# Patient Record
Sex: FEMALE | Race: WHITE | NOT HISPANIC OR LATINO | Employment: OTHER | URBAN - METROPOLITAN AREA
[De-identification: names, ages, dates, MRNs, and addresses within clinical notes are randomized per-mention and may not be internally consistent; named-entity substitution may affect disease eponyms.]

---

## 2023-10-31 ENCOUNTER — HOSPITAL ENCOUNTER (OUTPATIENT)
Dept: RADIOLOGY | Facility: HOSPITAL | Age: 68
Discharge: HOME | End: 2023-10-31
Payer: MEDICARE

## 2023-10-31 DIAGNOSIS — M25.562 ACUTE PAIN OF LEFT KNEE: ICD-10-CM

## 2023-10-31 PROCEDURE — 73721 MRI JNT OF LWR EXTRE W/O DYE: CPT | Mod: LT

## 2023-11-02 ENCOUNTER — TELEPHONE (OUTPATIENT)
Dept: ORTHOPEDIC SURGERY | Facility: CLINIC | Age: 68
End: 2023-11-02

## 2023-11-02 ENCOUNTER — OFFICE VISIT (OUTPATIENT)
Dept: ORTHOPEDIC SURGERY | Facility: CLINIC | Age: 68
End: 2023-11-02
Payer: MEDICARE

## 2023-11-02 ENCOUNTER — ANCILLARY PROCEDURE (OUTPATIENT)
Dept: RADIOLOGY | Facility: CLINIC | Age: 68
End: 2023-11-02
Payer: MEDICARE

## 2023-11-02 VITALS — BODY MASS INDEX: 34.15 KG/M2 | WEIGHT: 200 LBS | HEIGHT: 64 IN

## 2023-11-02 DIAGNOSIS — M25.552 LEFT HIP PAIN: ICD-10-CM

## 2023-11-02 DIAGNOSIS — S83.242S ACUTE MEDIAL MENISCAL TEAR, LEFT, SEQUELA: Primary | ICD-10-CM

## 2023-11-02 PROCEDURE — 20610 DRAIN/INJ JOINT/BURSA W/O US: CPT | Performed by: ORTHOPAEDIC SURGERY

## 2023-11-02 PROCEDURE — 1160F RVW MEDS BY RX/DR IN RCRD: CPT | Performed by: ORTHOPAEDIC SURGERY

## 2023-11-02 PROCEDURE — 73502 X-RAY EXAM HIP UNI 2-3 VIEWS: CPT | Mod: LT

## 2023-11-02 PROCEDURE — 73502 X-RAY EXAM HIP UNI 2-3 VIEWS: CPT | Mod: LEFT SIDE | Performed by: RADIOLOGY

## 2023-11-02 PROCEDURE — 99213 OFFICE O/P EST LOW 20 MIN: CPT | Performed by: ORTHOPAEDIC SURGERY

## 2023-11-02 PROCEDURE — 1125F AMNT PAIN NOTED PAIN PRSNT: CPT | Performed by: ORTHOPAEDIC SURGERY

## 2023-11-02 PROCEDURE — 1159F MED LIST DOCD IN RCRD: CPT | Performed by: ORTHOPAEDIC SURGERY

## 2023-11-02 RX ORDER — LIDOCAINE HYDROCHLORIDE 20 MG/ML
2 INJECTION, SOLUTION INFILTRATION; PERINEURAL
Status: COMPLETED | OUTPATIENT
Start: 2023-11-02 | End: 2023-11-02

## 2023-11-02 RX ORDER — KETOROLAC TROMETHAMINE 30 MG/ML
30 INJECTION, SOLUTION INTRAMUSCULAR; INTRAVENOUS
Status: COMPLETED | OUTPATIENT
Start: 2023-11-02 | End: 2023-11-02

## 2023-11-02 RX ORDER — TRIAMCINOLONE ACETONIDE 40 MG/ML
80 INJECTION, SUSPENSION INTRA-ARTICULAR; INTRAMUSCULAR
Status: COMPLETED | OUTPATIENT
Start: 2023-11-02 | End: 2023-11-02

## 2023-11-02 RX ADMIN — TRIAMCINOLONE ACETONIDE 80 MG: 40 INJECTION, SUSPENSION INTRA-ARTICULAR; INTRAMUSCULAR at 11:37

## 2023-11-02 RX ADMIN — KETOROLAC TROMETHAMINE 30 MG: 30 INJECTION, SOLUTION INTRAMUSCULAR; INTRAVENOUS at 11:37

## 2023-11-02 RX ADMIN — LIDOCAINE HYDROCHLORIDE 2 ML: 20 INJECTION, SOLUTION INFILTRATION; PERINEURAL at 11:37

## 2023-11-02 ASSESSMENT — PAIN SCALES - GENERAL: PAINLEVEL_OUTOF10: 7

## 2023-11-02 ASSESSMENT — PAIN - FUNCTIONAL ASSESSMENT: PAIN_FUNCTIONAL_ASSESSMENT: 0-10

## 2023-11-02 NOTE — PROGRESS NOTES
Subjective    Patient ID: Hanh Ng is a 68 y.o. female.    Chief Complaint: Follow-up of the Left Knee     Last Surgery: No surgery found  Last Surgery Date: No surgery found    HPI left knee pain she is in after her MRI    Objective   Ortho Exam range of motion 0 to 115 degrees excellent stability her pain today is mainly lateral to the patellar tendon she also has pain when I internally rotate her hip and this radiates to her knee    Image Results:  MR knee left wo IV contrast  Narrative: Interpreted By:  Jordi Hankins,   STUDY:  MR KNEE LEFT WO IV CONTRAST; 10/31/2023 9:10 am      INDICATION:  Signs/Symptoms:MECHANICAL LEFT KNEE PAIN; /pain laterally/fall 1  month ago      COMPARISON:  None      ACCESSION NUMBER(S):  [id number]      ORDERING CLINICIAN:  SAMANTA FONG      TECHNIQUE:  Multiple, multiplanar sequences of the left knee were obtained.      FINDINGS:  Horizontal tear through the posterior horn of the medial meniscus can  be seen. Equivocal body segment flap fragment seen within the  inferior gutter.      The lateral meniscus is intact.      The ACL and PCL are intact. The MCL and lateral ligamentous complex  are unremarkable.      Moderate thinning of the medial tibiofemoral articular cartilage can  be seen along the inner margin.      The lateral tibiofemoral articular cartilage reveals surface  fissuring noted.      The patellofemoral articular cartilage is moderately thinned.      The quadriceps and patellar tendons are intact.      There is no significant joint effusion. No Wasserman's cyst is identified.      No acute fracture or dislocation is identified. The bone marrow is  grossly normal.      Impression: 1. Horizontal tear posterior horn medial meniscus with tear  communicating with the inferior articular surface. Additional  equivocal body segment flap fragment within the inferior gutter.  2. Mild-to-moderate tricompartmental osteoarthrosis as above.      MACRO:  None      Signed by:  Jordi Hankins 10/31/2023 10:36 AM  Dictation workstation:   [id number]      Assessment/Plan her MRI shows some arthritis and a significant medial meniscal tear we discussed this we discussed arthroscopy we determined we will go ahead and try this 1 more cortisone shot she is good to continue doing her exercises and we will get x-rays of her hip on the way out to see if this could be contributing to her pain it is odd that her pain is not more medial today    L Inj/Asp: L knee on 11/2/2023 11:37 AM  Indications: pain  Details: 22 G needle, anterolateral approach  Medications: 80 mg triamcinolone acetonide 40 mg/mL; 2 mL lidocaine 20 mg/mL (2 %); 30 mg ketorolac 30 mg/mL (1 mL) (1 cc of Toradol 30mg/1mL was added)  Outcome: tolerated well, no immediate complications  Procedure, treatment alternatives, risks and benefits explained, specific risks discussed. Immediately prior to procedure a time out was called to verify the correct patient, procedure, equipment, support staff and site/side marked as required. Patient was prepped and draped in the usual sterile fashion.           Encounter Diagnoses:  No diagnosis found.    No orders of the defined types were placed in this encounter.    No follow-ups on file.

## 2023-11-10 ENCOUNTER — TELEPHONE (OUTPATIENT)
Dept: ORTHOPEDIC SURGERY | Facility: CLINIC | Age: 68
End: 2023-11-10
Payer: MEDICARE

## 2023-11-28 ENCOUNTER — APPOINTMENT (OUTPATIENT)
Dept: ORTHOPEDIC SURGERY | Facility: CLINIC | Age: 68
End: 2023-11-28
Payer: MEDICARE

## 2023-12-01 ENCOUNTER — APPOINTMENT (OUTPATIENT)
Dept: ORTHOPEDIC SURGERY | Facility: CLINIC | Age: 68
End: 2023-12-01
Payer: MEDICARE

## 2023-12-08 ENCOUNTER — OFFICE VISIT (OUTPATIENT)
Dept: ORTHOPEDIC SURGERY | Facility: CLINIC | Age: 68
End: 2023-12-08
Payer: MEDICARE

## 2023-12-08 DIAGNOSIS — S83.242S ACUTE MEDIAL MENISCAL TEAR, LEFT, SEQUELA: Primary | ICD-10-CM

## 2023-12-08 PROCEDURE — 99213 OFFICE O/P EST LOW 20 MIN: CPT | Performed by: ORTHOPAEDIC SURGERY

## 2023-12-08 PROCEDURE — 1125F AMNT PAIN NOTED PAIN PRSNT: CPT | Performed by: ORTHOPAEDIC SURGERY

## 2023-12-08 PROCEDURE — 1159F MED LIST DOCD IN RCRD: CPT | Performed by: ORTHOPAEDIC SURGERY

## 2023-12-08 PROCEDURE — 1160F RVW MEDS BY RX/DR IN RCRD: CPT | Performed by: ORTHOPAEDIC SURGERY

## 2023-12-08 NOTE — PROGRESS NOTES
Subjective    Patient ID: Hanh Ng is a 68 y.o. female.    Chief Complaint: Follow-up of the Left Hip     Last Surgery: No surgery found  Last Surgery Date: No surgery found    HPI she is in for her knee again she has a meniscal tear some arthritic change and some arthritic change in the left hip but today it seems to be the left knee is bothering her most    Objective   Ortho Exam today her range of motion is 0 to 120 degrees general laxity medial joint line tenderness with positive Opal patellofemoral effusion    Image Results:  XR hip left 2 or 3 views  Narrative: Interpreted By:  Conchis Lovell,   STUDY:  Single view pelvis.  Left hip, two views.      INDICATION:  Signs/Symptoms:LEFT HIP PAIN.      COMPARISON:  None.      ACCESSION NUMBER(S):  [id number]      ORDERING CLINICIAN:  SAMANTA FONG      FINDINGS:  No acute fracture or malalignment.  Moderate to severe left hip osteoarthrosis with joint space loss and  subchondral sclerosis. Mild right hip osteoarthrosis.  Lower lumbar spine degenerative changes with levoscoliosis.  Soft tissues are within normal limits.      Impression: 1. Moderate to severe left hip osteoarthrosis.  2. Additional findings as above.      MACRO:  None.      Signed by: Conchis Lovell 11/3/2023 6:51 PM  Dictation workstation:   [id number]      Assessment/Plan again her MRI shows a significant medial meniscal tear some arthritis in her knee x-rays look intact hip x-ray shows arthritic changes at this point it appears that it is her knee is hurting injection did not help recommend scoping this saying that we will give her relief explained to her that it is unpredictable but she is certainly not a candidate for knee replacement at this point  Encounter Diagnoses:  Acute medial meniscal tear, left, sequela    No orders of the defined types were placed in this encounter.    No follow-ups on file.

## 2023-12-22 DIAGNOSIS — S83.242S ACUTE MEDIAL MENISCAL TEAR, LEFT, SEQUELA: ICD-10-CM

## 2024-01-15 ENCOUNTER — EVALUATION (OUTPATIENT)
Dept: PHYSICAL THERAPY | Facility: CLINIC | Age: 69
End: 2024-01-15
Payer: MEDICARE

## 2024-01-15 DIAGNOSIS — S83.242S ACUTE MEDIAL MENISCAL TEAR, LEFT, SEQUELA: ICD-10-CM

## 2024-01-15 PROCEDURE — 97161 PT EVAL LOW COMPLEX 20 MIN: CPT | Mod: GP

## 2024-01-15 PROCEDURE — 97110 THERAPEUTIC EXERCISES: CPT | Mod: GP

## 2024-01-15 ASSESSMENT — PAIN - FUNCTIONAL ASSESSMENT: PAIN_FUNCTIONAL_ASSESSMENT: 0-10

## 2024-01-15 ASSESSMENT — PAIN SCALES - GENERAL: PAINLEVEL_OUTOF10: 9

## 2024-01-15 NOTE — PROGRESS NOTES
Physical Therapy    Physical Therapy Treatment    Patient Name: Hanh Ng  MRN: [id number]  Today's Date: 1/15/2024         Assessment:  PT Assessment  Assessment Comment: pt with know Hx of L meniscus tear presents with impaired strength, ROM, and endurance mainly in the L hip.  These deficits impair her mechanics and add increased stress to the L knee during all activities.  She would benefit from skilled therapy to allow for improved gait mechanics, easier stair negotiation, and return to her home gym routine.  Plan:  OP PT Plan  Treatment/Interventions: Blood flow restriction therapy, Dry needling, Electrical stimulation, Education/ Instruction, Gait training, Manual therapy, Neuromuscular re-education, Taping techniques, Therapeutic activities, Therapeutic exercises  PT Plan: Skilled PT    Current Problem  1. Acute medial meniscal tear, left, sequela  Referral to Physical Therapy    Follow Up In Physical Therapy          General     General  Reason for Referral: L knee pain  Referred By: walker  Past Medical History Relevant to Rehab: L meniscus tear  General Comment: pt states her knee pain is getting worse from the meniscus tear, does not hurt to walk, but STS hurts tremendously, NSAIDs do not work anymore.  She states    Subjective      Pain  Pain Assessment  Pain Assessment: 0-10  Pain Score: 9  Pain Type: Chronic pain  Pain Location: Knee  Pain Orientation: Left    Objective     Lumbar AROM  Lumbar AROM WFL: yes    Functional Rating Scale  LEFS   /80: 43/80 53% self reported disability  Other: WOMAC 46/96 47% self reported disability  Observation     Hip Palpation/Joint Mobility   Palpation / Joint Mobility Comment: TTP L GT, MCL and medial joint space L knee,  Lumbar AROM  Lumbar AROM WFL: yes  Hip AROM  R hip flexion: (125°): 120  L hip flexion: (125°): 110  R hip abduction: (45°): 45  L hip abduction: (45°): 40  R hip extension: (10°): 10  L hip extension: (10°): -10  R hip ER: (45°): 45  L hip ER:  (45°): 30  R hip IR: (45°): 20  L hip IR: (45°): 0  Hip PROM     Specific Lower Extremity MMT  R Iliopsoas: (5/5): 5/5  L Iliopsoas: (5/5): 4+/5  R Gluteals (prone): (5/5): 4-/5  L Gluteals (prone): (5/5): 3-/5  R Gluteals (sidelying): (5/5): 3+/5  L Gluteals (sidelying): (5/5): 3+/5  R knee flexion: (5/5): 5/5  L knee flexion: (5/5): 4/5  R knee extension: (5/5): 5/5  L knee extension: (5/5): 4+/5  DTR     Special Tests  Supine SLR: (Negative): pos for lag sign after 1 rep LLE, RLE x10 no lag sign  DAY: (Negative): pos L, pos FADDIR L  Gait     Flexibility  R hamstrings: -10  L hamstrings: -10    Treatments:  Therapeutic Exercise  Therapeutic Exercise Activity 1: glute bridge x25  Therapeutic Exercise Activity 2: hooklying clamshells b/l x30 yellow TB  Therapeutic Exercise Activity 3: LAQs    OP EDUCATION:  Outpatient Education  Individual(s) Educated: Patient  Education Provided: Home Exercise Program, Physiology, POC    Goals:  Active       PT Problem       report 25% decrease in pain intensity in order to complete work tasks with greater ease        Start:  01/15/24    Expected End:  04/08/24            demonstrate 1/3 a muscle grade strength increase in b/l hips  in order to complete STS easier         Start:  01/15/24    Expected End:  04/08/24            increased ROM of L hip +15 degrees in order to improve gait mechanics        Start:  01/15/24    Expected End:  04/08/24            Pt will improve WOMAC score by 10% to demonstrate in order to show increased functional mobility        Start:  01/15/24    Expected End:  04/08/24            demo HEP w/ at least 75% accuracy in order to increase strength and flexibility        Start:  01/15/24    Expected End:  04/08/24                Access Code: [email or web address]  URL: [email or web address]  Date: 01/15/2024  Prepared by: Aung Whiteside    Exercises  - Supine Bridge  - 2 x daily - 7 x weekly - 3 sets - 20-30 reps  - Sitting Knee Extension  with Resistance  - 2 x daily - 7 x weekly - 3 sets - 20-30 reps  - Seated Hamstring Curl with Anchored Resistance  - 2 x daily - 7 x weekly - 3 sets - 20-30 reps  - Hooklying Clamshell with Resistance  - 2 x daily - 7 x weekly - 3 sets - 20-30 reps

## 2024-01-15 NOTE — LETTER
January 15, 2024    Aung Whiteside PT  [address] [facility]  Rehab Services, [facility] [address]  [facility] [address]    Patient: Hanh Ng   YOB: 1955   Date of Visit: 1/15/2024       Dear Ron Winters Md  No address on file    The attached plan of care is being sent to you because your patient’s medical reimbursement requires that you certify the plan of care. Your signature is required to allow uninterrupted insurance coverage.      You may indicate your approval by signing below and faxing this form back to us at Dept Fax: [phone].    Please call Dept: [phone] with any questions or concerns.    Thank you for this referral,        Aung Whiteside PT  [facility] [address] [facility]  [address] [facility] [address]    Payer: Payor: MEDICARE / Plan: MEDICARE PART A AND B / Product Type: *No Product type* /                                                                         Date:     Dear Aung Whiteside PT,     Re: Ms. Hanh Ng, MRN:[id number]    I certify that I have reviewed the attached plan of care and it is medically necessary for Ms. Hanh Ng (1955) who is under my care.          ______________________________________                    _________________  Provider name and credentials                                           Date and time                                                                                           Plan of Care 1/15/24   Effective from: 1/15/2024  Effective to: 4/8/2024    Plan ID: [id number]            Participants as of Finalize on 1/15/2024    Name Type Comments Contact Info    Ron Winters MD Referring Provider      Aung Whiteside PT Physical Therapist  [phone]       Last Plan Note     Author: Aung Whiteside PT Status: Sign when Signing Visit Last edited: 1/15/2024 11:45 AM       Physical Therapy    Physical Therapy Treatment    Patient Name: Hanh Ng  MRN: [id number]  Today's Date:  1/15/2024         Assessment:  PT Assessment  Assessment Comment: pt with know Hx of L meniscus tear presents with impaired strength, ROM, and endurance mainly in the L hip.  These deficits impair her mechanics and add increased stress to the L knee during all activities.  She would benefit from skilled therapy to allow for improved gait mechanics, easier stair negotiation, and return to her home gym routine.  Plan:  OP PT Plan  Treatment/Interventions: Blood flow restriction therapy, Dry needling, Electrical stimulation, Education/ Instruction, Gait training, Manual therapy, Neuromuscular re-education, Taping techniques, Therapeutic activities, Therapeutic exercises  PT Plan: Skilled PT    Current Problem  1. Acute medial meniscal tear, left, sequela  Referral to Physical Therapy    Follow Up In Physical Therapy          General     General  Reason for Referral: L knee pain  Referred By: walker  Past Medical History Relevant to Rehab: L meniscus tear  General Comment: pt states her knee pain is getting worse from the meniscus tear, does not hurt to walk, but STS hurts tremendously, NSAIDs do not work anymore.  She states    Subjective      Pain  Pain Assessment  Pain Assessment: 0-10  Pain Score: 9  Pain Type: Chronic pain  Pain Location: Knee  Pain Orientation: Left    Objective     Lumbar AROM  Lumbar AROM WFL: yes    Functional Rating Scale  LEFS   /80: 43/80 53% self reported disability  Other: WOMAC 46/96 47% self reported disability  Observation     Hip Palpation/Joint Mobility   Palpation / Joint Mobility Comment: TTP L GT, MCL and medial joint space L knee,  Lumbar AROM  Lumbar AROM WFL: yes  Hip AROM  R hip flexion: (125°): 120  L hip flexion: (125°): 110  R hip abduction: (45°): 45  L hip abduction: (45°): 40  R hip extension: (10°): 10  L hip extension: (10°): -10  R hip ER: (45°): 45  L hip ER: (45°): 30  R hip IR: (45°): 20  L hip IR: (45°): 0  Hip PROM     Specific Lower Extremity MMT  R Iliopsoas:  (5/5): 5/5  L Iliopsoas: (5/5): 4+/5  R Gluteals (prone): (5/5): 4-/5  L Gluteals (prone): (5/5): 3-/5  R Gluteals (sidelying): (5/5): 3+/5  L Gluteals (sidelying): (5/5): 3+/5  R knee flexion: (5/5): 5/5  L knee flexion: (5/5): 4/5  R knee extension: (5/5): 5/5  L knee extension: (5/5): 4+/5  DTR     Special Tests  Supine SLR: (Negative): pos for lag sign after 1 rep LLE, RLE x10 no lag sign  DAY: (Negative): pos L, pos FADDIR L  Gait     Flexibility  R hamstrings: -10  L hamstrings: -10    Treatments:  Therapeutic Exercise  Therapeutic Exercise Activity 1: glute bridge x25  Therapeutic Exercise Activity 2: hooklying clamshells b/l x30 yellow TB  Therapeutic Exercise Activity 3: LAQs    OP EDUCATION:  Outpatient Education  Individual(s) Educated: Patient  Education Provided: Home Exercise Program, Physiology, POC    Goals:  Active       PT Problem       report 25% decrease in pain intensity in order to complete work tasks with greater ease        Start:  01/15/24    Expected End:  04/08/24            demonstrate 1/3 a muscle grade strength increase in b/l hips  in order to complete STS easier         Start:  01/15/24    Expected End:  04/08/24            increased ROM of L hip +15 degrees in order to improve gait mechanics        Start:  01/15/24    Expected End:  04/08/24            Pt will improve WOMAC score by 10% to demonstrate in order to show increased functional mobility        Start:  01/15/24    Expected End:  04/08/24            demo HEP w/ at least 75% accuracy in order to increase strength and flexibility        Start:  01/15/24    Expected End:  04/08/24                Access Code: [email or web address]  URL: [email or web address]  Date: 01/15/2024  Prepared by: Aung Whiteside    Exercises  - Supine Bridge  - 2 x daily - 7 x weekly - 3 sets - 20-30 reps  - Sitting Knee Extension with Resistance  - 2 x daily - 7 x weekly - 3 sets - 20-30 reps  - Seated Hamstring Curl with Anchored  Resistance  - 2 x daily - 7 x weekly - 3 sets - 20-30 reps  - Hooklying Clamshell with Resistance  - 2 x daily - 7 x weekly - 3 sets - 20-30 reps         Current Participants as of 1/15/2024    Name Type Comments Contact Info    Ron Winters MD Referring Provider      Signature pending    Aung Whiteside, AVELINA Physical Therapist  [phone]    Signature pending

## 2024-01-22 ENCOUNTER — TREATMENT (OUTPATIENT)
Dept: PHYSICAL THERAPY | Facility: CLINIC | Age: 69
End: 2024-01-22
Payer: MEDICARE

## 2024-01-22 DIAGNOSIS — S83.242S ACUTE MEDIAL MENISCAL TEAR, LEFT, SEQUELA: ICD-10-CM

## 2024-01-22 PROCEDURE — 97110 THERAPEUTIC EXERCISES: CPT | Mod: GP

## 2024-01-22 PROCEDURE — 97140 MANUAL THERAPY 1/> REGIONS: CPT | Mod: GP

## 2024-01-22 ASSESSMENT — PAIN - FUNCTIONAL ASSESSMENT: PAIN_FUNCTIONAL_ASSESSMENT: 0-10

## 2024-01-22 ASSESSMENT — PAIN SCALES - GENERAL: PAINLEVEL_OUTOF10: 8

## 2024-01-22 NOTE — PROGRESS NOTES
Physical Therapy    Physical Therapy Treatment    Patient Name: Hanh Ng  MRN: [id number]  Today's Date: 1/22/2024  Time Calculation  Start Time: 1130  Stop Time: 1214  Time Calculation (min): 44 min      Assessment:   No adverse effect with MT and exercise this date, minimal cueing for form,   Plan:   Continue with MT and strengthening to improve AROM LLE    Current Problem  1. Acute medial meniscal tear, left, sequela  Follow Up In Physical Therapy          General     General  Reason for Referral: L knee pain  Referred By: walker  Past Medical History Relevant to Rehab: L meniscus tear  General Comment: pt reports she is doing the exercises but has a little more pain this date due to increased time on her feet yesterday    Subjective      Pain  Pain Assessment  Pain Assessment: 0-10  Pain Score: 8  Pain Type: Chronic pain  Pain Location: Knee  Pain Orientation: Left    Objective   Treatments:  Therapeutic Exercise  Therapeutic Exercise Activity 1: glute bridge 2x25  Therapeutic Exercise Activity 2: hooklying clamshells b/l 2x25 yellow TB    Manual Therapy  Manual Therapy Activity 1: STM to glute med, TFL, quads, HS, and IT band with thera gun  Manual Therapy Activity 2: axial traction with grade 3 osscillations  Manual Therapy Activity 3: sustained axial traction LLE    OP EDUCATION:   Discussed continuing gym activity along with HEP, increasing rest breaks to avoid increased pain    Goals:  Active       PT Problem       report 25% decrease in pain intensity in order to complete work tasks with greater ease  (Progressing)       Start:  01/15/24    Expected End:  04/08/24            demonstrate 1/3 a muscle grade strength increase in b/l hips  in order to complete STS easier   (Progressing)       Start:  01/15/24    Expected End:  04/08/24            increased ROM of L hip +15 degrees in order to improve gait mechanics  (Progressing)       Start:  01/15/24    Expected End:  04/08/24            Pt will improve  WOMAC score by 10% to demonstrate in order to show increased functional mobility  (Progressing)       Start:  01/15/24    Expected End:  04/08/24            demo HEP w/ at least 75% accuracy in order to increase strength and flexibility  (Progressing)       Start:  01/15/24    Expected End:  04/08/24

## 2024-02-09 ENCOUNTER — APPOINTMENT (OUTPATIENT)
Dept: PHYSICAL THERAPY | Facility: CLINIC | Age: 69
End: 2024-02-09
Payer: MEDICARE

## 2024-02-13 ENCOUNTER — TREATMENT (OUTPATIENT)
Dept: PHYSICAL THERAPY | Facility: CLINIC | Age: 69
End: 2024-02-13
Payer: MEDICARE

## 2024-02-13 DIAGNOSIS — S83.242S ACUTE MEDIAL MENISCAL TEAR, LEFT, SEQUELA: ICD-10-CM

## 2024-02-13 PROCEDURE — 97110 THERAPEUTIC EXERCISES: CPT | Mod: GP

## 2024-02-13 ASSESSMENT — PAIN SCALES - GENERAL: PAINLEVEL_OUTOF10: 4

## 2024-02-13 ASSESSMENT — PAIN - FUNCTIONAL ASSESSMENT: PAIN_FUNCTIONAL_ASSESSMENT: 0-10

## 2024-02-13 NOTE — PROGRESS NOTES
Physical Therapy  Physical Therapy Treatment    Patient Name: Hanh Ng  MRN: [id number]  Today's Date: 2/13/2024  Time Calculation  Start Time: 1447  Stop Time: 1531  Time Calculation (min): 44 min    Insurance:  Visit number: 3 of 17      General  Chief Complaint:   1. Acute medial meniscal tear, left, sequela  Follow Up In Physical Therapy          Subjective:     Current Problem  General  Reason for Referral: L knee pain  Referred By: walker  Past Medical History Relevant to Rehab: L meniscus tear  General Comment: pt states she is exercising at the gym, but still has difficulty with STS    Precautions: none       Performing HEP?: Yes    Pain  Pain Assessment: 0-10  Pain Score: 4  Pain Type: Chronic pain  Pain Location: Knee  Pain Orientation: Left      Objective:       Treatments:   This therapist instructed and demonstrated interventions to patient, patient completed the following under direct supervision of this therapist:  Therapeutic Exercise:   min  44 MINUTES  Therapeutic Exercise  Therapeutic Exercise Activity 1: Gemmyofit recumbent bicycle, level 1.0, seat 9 x 8 mins  Therapeutic Exercise Activity 2: hooklying clamshells b/l 2x25 yellow TB  Therapeutic Exercise Activity 3: LAQs LLE 3# 2x25  Therapeutic Exercise Activity 4: seated HS curls yellow YB 2x25  Therapeutic Exercise Activity 5: b/l heel raises 2x25  Therapeutic Exercise Activity 6: single LLE heel raises  Therapeutic Exercise Activity 7: glute bridge 2x25      Assessment:  PT Assessment  Assessment Comment: pt alb eot produce more reps this date, no pain with exercises this date, only a few cues for exercise form    Plan:  Continue with TE and progress as tolerated       Active       PT Problem       report 25% decrease in pain intensity in order to complete work tasks with greater ease  (Progressing)       Start:  01/15/24    Expected End:  04/08/24            demonstrate 1/3 a muscle grade strength increase in b/l hips  in order to  complete STS easier   (Progressing)       Start:  01/15/24    Expected End:  04/08/24            increased ROM of L hip +15 degrees in order to improve gait mechanics  (Progressing)       Start:  01/15/24    Expected End:  04/08/24            Pt will improve WOMAC score by 10% to demonstrate in order to show increased functional mobility  (Progressing)       Start:  01/15/24    Expected End:  04/08/24            demo HEP w/ at least 75% accuracy in order to increase strength and flexibility  (Progressing)       Start:  01/15/24    Expected End:  04/08/24                  Aung Whiteside, PT

## 2024-02-20 ENCOUNTER — TREATMENT (OUTPATIENT)
Dept: PHYSICAL THERAPY | Facility: CLINIC | Age: 69
End: 2024-02-20
Payer: MEDICARE

## 2024-02-20 DIAGNOSIS — S83.242S ACUTE MEDIAL MENISCAL TEAR, LEFT, SEQUELA: ICD-10-CM

## 2024-02-20 PROCEDURE — 97110 THERAPEUTIC EXERCISES: CPT | Mod: GP | Performed by: PHYSICAL THERAPIST

## 2024-02-20 ASSESSMENT — PAIN - FUNCTIONAL ASSESSMENT: PAIN_FUNCTIONAL_ASSESSMENT: 0-10

## 2024-02-20 ASSESSMENT — PAIN SCALES - GENERAL: PAINLEVEL_OUTOF10: 0 - NO PAIN

## 2024-02-20 NOTE — PROGRESS NOTES
"Physical Therapy    Physical Therapy Treatment    Patient Name: Hanh Ng  MRN: [id number]  Today's Date: 2/20/2024         Assessment:   Patient challenged by the current therapeutic exercise but was able to increase intensity with only muscle fatigue noted.   Plan:   Progress as tolerated    Current Problem  1. Acute medial meniscal tear, left, sequela  Follow Up In Physical Therapy          General     General  Reason for Referral: L knee pain  Referred By: walker  Past Medical History Relevant to Rehab: L meniscus tear  General Comment: \"I was sore about two days after the last appointment, but we did about an hour and a half in the gym before coming here. Otherwise it has been feeling better, but I am on a lot of Advil.\" Patient enters with a new brace to assist in gait and knee extension.    Subjective    Pain  Pain Assessment  Pain Assessment: 0-10  Pain Score: 0 - No pain  Pain Location: Knee  Pain Orientation: Left    Objective     Treatments:  Therapeutic Exercise  Therapeutic Exercise Activity 1: scifit recumbent bicycle, level 1.0, seat 9 x 6 mins  Therapeutic Exercise Activity 2: Side lying: clamshells b/l 2x20 red TB  Therapeutic Exercise Activity 3: LAQs LLE 3# 2x25  Therapeutic Exercise Activity 4: seated HS curls yellow YB 2x25  Therapeutic Exercise Activity 5: b/l heel raises 2x25  Therapeutic Exercise Activity 6: single LLE heel raises  Therapeutic Exercise Activity 7: Bridges: 1 x 15, 1 x15 with hip abduction, 1 x 15 red tband  Total time: 40'  OP EDUCATION:       Goals:  Active       PT Problem       report 25% decrease in pain intensity in order to complete work tasks with greater ease  (Progressing)       Start:  01/15/24    Expected End:  04/08/24            demonstrate 1/3 a muscle grade strength increase in b/l hips  in order to complete STS easier   (Progressing)       Start:  01/15/24    Expected End:  04/08/24            increased ROM of L hip +15 degrees in order to improve gait " mechanics  (Progressing)       Start:  01/15/24    Expected End:  04/08/24            Pt will improve WOMAC score by 10% to demonstrate in order to show increased functional mobility  (Progressing)       Start:  01/15/24    Expected End:  04/08/24            demo HEP w/ at least 75% accuracy in order to increase strength and flexibility  (Progressing)       Start:  01/15/24    Expected End:  04/08/24

## 2024-02-29 ENCOUNTER — APPOINTMENT (OUTPATIENT)
Dept: PHYSICAL THERAPY | Facility: CLINIC | Age: 69
End: 2024-02-29
Payer: MEDICARE

## 2024-03-01 ENCOUNTER — TREATMENT (OUTPATIENT)
Dept: PHYSICAL THERAPY | Facility: CLINIC | Age: 69
End: 2024-03-01
Payer: MEDICARE

## 2024-03-01 DIAGNOSIS — S83.242S ACUTE MEDIAL MENISCAL TEAR, LEFT, SEQUELA: ICD-10-CM

## 2024-03-01 PROCEDURE — 97140 MANUAL THERAPY 1/> REGIONS: CPT | Mod: GP

## 2024-03-01 PROCEDURE — 97110 THERAPEUTIC EXERCISES: CPT | Mod: GP

## 2024-03-01 ASSESSMENT — PAIN SCALES - GENERAL: PAINLEVEL_OUTOF10: 7

## 2024-03-01 ASSESSMENT — PAIN - FUNCTIONAL ASSESSMENT: PAIN_FUNCTIONAL_ASSESSMENT: 0-10

## 2024-03-01 NOTE — PROGRESS NOTES
"    Physical Therapy  Physical Therapy Treatment    Patient Name: Hanh Ng  MRN: [id number]  Today's Date: 3/1/2024  Time Calculation  Start Time: 1532  Stop Time: 1614  Time Calculation (min): 42 min    Insurance:  Visit number: 5 of 17    Assessment:  PT Assessment  Assessment Comment: pt did not have difficulty with exericeses, she continues to report improved pain after STM, able to produce more reps this date    Plan:  Continue with AROM and strengthening for L knee       General  Chief Complaint:   1. Acute medial meniscal tear, left, sequela  Follow Up In Physical Therapy          Subjective:     Current Problem  General  Reason for Referral: L knee pain  Referred By: walker  Past Medical History Relevant to Rehab: L meniscus tear  General Comment: pt reports feeling better after working out yesterday.    Precautions  Precautions Comment: none    Performing HEP?: Yes    Pain  Pain Assessment: 0-10  Pain Score: 7  Pain Type: Chronic pain  Pain Location: Knee  Pain Orientation: Left    Objective:     Treatments:   This therapist instructed and demonstrated interventions to patient, patient completed the following under direct supervision of this therapist:  Therapeutic Exercise:   min  32 MINUTES  Therapeutic Exercise  Therapeutic Exercise Activity 1: [employer] recumbent bicycle, level 1.0, seat 9 x 6 mins  Therapeutic Exercise Activity 2: b/l heel raises 2x25  Therapeutic Exercise Activity 3: standing HS curls 2# LLE 2x25  Therapeutic Exercise Activity 4: reciprocal taps to 6\" block 2x30 UUE support in bars 2# LLE  Therapeutic Exercise Activity 5: supine glute bridges with yellow TB around knees x20, x25  Therapeutic Exercise Activity 6: supine SLR 2x15  Therapeutic Exercise Activity 7: STS from elevated surface with on UE assist x25, x20  Manual Therapy:       10 MINUTES  Manual Therapy  Manual Therapy Activity 1: STM to R quad with thera gun  Manual Therapy Activity 2: MWMT L tibia during SAQs for ER to " improve TKE        Active       PT Problem       report 25% decrease in pain intensity in order to complete work tasks with greater ease  (Progressing)       Start:  01/15/24    Expected End:  04/08/24            demonstrate 1/3 a muscle grade strength increase in b/l hips  in order to complete STS easier   (Progressing)       Start:  01/15/24    Expected End:  04/08/24            increased ROM of L hip +15 degrees in order to improve gait mechanics  (Progressing)       Start:  01/15/24    Expected End:  04/08/24            Pt will improve WOMAC score by 10% to demonstrate in order to show increased functional mobility  (Progressing)       Start:  01/15/24    Expected End:  04/08/24            demo HEP w/ at least 75% accuracy in order to increase strength and flexibility  (Progressing)       Start:  01/15/24    Expected End:  04/08/24              Education:   Discussed manual therapy purpose and benefits    Aung Whiteside, PT

## 2024-03-04 ENCOUNTER — APPOINTMENT (OUTPATIENT)
Dept: PHYSICAL THERAPY | Facility: CLINIC | Age: 69
End: 2024-03-04
Payer: MEDICARE

## 2024-03-11 ENCOUNTER — TREATMENT (OUTPATIENT)
Dept: PHYSICAL THERAPY | Facility: CLINIC | Age: 69
End: 2024-03-11
Payer: MEDICARE

## 2024-03-11 DIAGNOSIS — S83.242S ACUTE MEDIAL MENISCAL TEAR, LEFT, SEQUELA: ICD-10-CM

## 2024-03-11 PROCEDURE — 97116 GAIT TRAINING THERAPY: CPT | Mod: GP | Performed by: PHYSICAL THERAPIST

## 2024-03-11 PROCEDURE — 97110 THERAPEUTIC EXERCISES: CPT | Mod: GP | Performed by: PHYSICAL THERAPIST

## 2024-03-11 ASSESSMENT — PAIN SCALES - GENERAL: PAINLEVEL_OUTOF10: 5 - MODERATE PAIN

## 2024-03-11 ASSESSMENT — PAIN - FUNCTIONAL ASSESSMENT: PAIN_FUNCTIONAL_ASSESSMENT: 0-10

## 2024-03-11 NOTE — PROGRESS NOTES
"Physical Therapy    Physical Therapy Treatment    Patient Name: Hanh Ng  MRN: [id number]  Today's Date: 3/11/2024         Assessment:   Patient challenged by current therapeutic exercise regiment but overall is progressing well. She did display much improved gait pattern and speed once she was able to practice proper patterning.   Plan:   Progress as tolerated    Current Problem  1. Acute medial meniscal tear, left, sequela  Follow Up In Physical Therapy          General     General  Reason for Referral: L knee pain  Referred By: walker  Past Medical History Relevant to Rehab: L meniscus tear  General Comment: \"I am feeling better. I sat in the car back from [place] and then took a shower. The knee is stiff.\" Patient reports doing the bike instead of treadmill at the gym this weekend and having much less pain post workout.    Subjective    Pain  Pain Assessment  Pain Assessment: 0-10  Pain Score: 5 - Moderate pain  Pain Type: Chronic pain  Pain Location: Knee  Pain Orientation: Left    Objective     Treatments:  Therapeutic Exercise  Therapeutic Exercise Activity 1: Scifit: seat 14, lvel 1, 6'  Therapeutic Exercise Activity 2: b/l heel raises 30 x  Therapeutic Exercise Activity 3: standing HS curls 3# LLE 2x20  Therapeutic Exercise Activity 4: reciprocal taps to 8\" block 2x20 UUE support in bars 3# LLE  Therapeutic Exercise Activity 5: TKE: Green tband 2 x 20  Therapeutic Exercise Activity 6: bridges: green Tband with hip abduction: 20 x 2  Therapeutic Exercise Activity 7: SLR: 10 x 2  Therapeutic Exercise Activity 8: LAQ: with hip adduction squeeze: 3# 2 x 10  Total Time: 32'  Ambulation/Gait Training  Ambulation/Gait Training Performed: Yes  Gait training: 10'   OP EDUCATION:   See above     Goals:  Active       PT Problem       report 25% decrease in pain intensity in order to complete work tasks with greater ease  (Progressing)       Start:  01/15/24    Expected End:  04/08/24            demonstrate 1/3 " a muscle grade strength increase in b/l hips  in order to complete STS easier   (Progressing)       Start:  01/15/24    Expected End:  04/08/24            increased ROM of L hip +15 degrees in order to improve gait mechanics  (Progressing)       Start:  01/15/24    Expected End:  04/08/24            Pt will improve WOMAC score by 10% to demonstrate in order to show increased functional mobility  (Progressing)       Start:  01/15/24    Expected End:  04/08/24            demo HEP w/ at least 75% accuracy in order to increase strength and flexibility  (Progressing)       Start:  01/15/24    Expected End:  04/08/24

## 2024-03-18 ENCOUNTER — APPOINTMENT (OUTPATIENT)
Dept: PHYSICAL THERAPY | Facility: CLINIC | Age: 69
End: 2024-03-18
Payer: MEDICARE

## 2024-03-22 ENCOUNTER — TREATMENT (OUTPATIENT)
Dept: PHYSICAL THERAPY | Facility: CLINIC | Age: 69
End: 2024-03-22
Payer: MEDICARE

## 2024-03-22 DIAGNOSIS — S83.242S ACUTE MEDIAL MENISCAL TEAR, LEFT, SEQUELA: ICD-10-CM

## 2024-03-22 PROCEDURE — 97110 THERAPEUTIC EXERCISES: CPT | Mod: GP

## 2024-03-22 ASSESSMENT — PAIN - FUNCTIONAL ASSESSMENT: PAIN_FUNCTIONAL_ASSESSMENT: 0-10

## 2024-03-22 ASSESSMENT — PAIN SCALES - GENERAL: PAINLEVEL_OUTOF10: 4

## 2024-03-22 NOTE — PROGRESS NOTES
Physical Therapy  Physical Therapy Treatment    Patient Name: Hanh Ng  MRN: [id number]  Today's Date: 3/22/2024  Time Calculation  Start Time: 1101  Stop Time: 1145  Time Calculation (min): 44 min    Insurance:  Visit number: 7 of 17    Assessment:   Pt able to progress to higher levels of resistance and no c/o pain, she is improving with her HEP and verbalized understanding to increase difficulty     Plan:  Continue with knee ROM and strengthening     General  Chief Complaint:   1. Acute medial meniscal tear, left, sequela  Follow Up In Physical Therapy          Subjective:     Current Problem  General  Reason for Referral: L knee pain  Referred By: walker  Past Medical History Relevant to Rehab: L meniscus tear  General Comment: pt states she is doing well and said her HEP is getting easier.    Precautions  Precautions Comment: none    Performing HEP?: Yes    Pain  Pain Assessment: 0-10  Pain Score: 4  Pain Type: Chronic pain  Pain Location: Knee  Pain Orientation: Left    Objective:     Treatments:   This therapist instructed and demonstrated interventions to patient, patient completed the following under direct supervision of this therapist:  Therapeutic Exercise:   min  45 MINUTES  Therapeutic Exercise  Therapeutic Exercise Activity 1: Scifit: seat 14, lvel 1, 6'  Therapeutic Exercise Activity 2: LAQ: with hip adduction squeeze: 4# 2x25  Therapeutic Exercise Activity 3: hooklying hip abduction blue TB x25  Therapeutic Exercise Activity 4: STS from elevated surface x20 with no UE support  Therapeutic Exercise Activity 5: seated knee flexion with red TB 2x25  Therapeutic Exercise Activity 6: supine SLR 3x15  Therapeutic Exercise Activity 7: supine glute bridges x25      Active       PT Problem       report 25% decrease in pain intensity in order to complete work tasks with greater ease  (Progressing)       Start:  01/15/24    Expected End:  04/08/24            demonstrate 1/3 a muscle grade strength  increase in b/l hips  in order to complete STS easier   (Progressing)       Start:  01/15/24    Expected End:  04/08/24            increased ROM of L hip +15 degrees in order to improve gait mechanics  (Progressing)       Start:  01/15/24    Expected End:  04/08/24            Pt will improve WOMAC score by 10% to demonstrate in order to show increased functional mobility  (Progressing)       Start:  01/15/24    Expected End:  04/08/24            demo HEP w/ at least 75% accuracy in order to increase strength and flexibility  (Progressing)       Start:  01/15/24    Expected End:  04/08/24                Education:   Discussed HEP and difficulty and lifestyle changes for weight loss    Aung Whiteside, PT

## 2024-04-19 ENCOUNTER — TREATMENT (OUTPATIENT)
Dept: PHYSICAL THERAPY | Facility: CLINIC | Age: 69
End: 2024-04-19
Payer: MEDICARE

## 2024-04-19 DIAGNOSIS — G89.29 CHRONIC PAIN OF LEFT KNEE: Primary | ICD-10-CM

## 2024-04-19 DIAGNOSIS — S83.242S ACUTE MEDIAL MENISCAL TEAR, LEFT, SEQUELA: ICD-10-CM

## 2024-04-19 DIAGNOSIS — M25.562 CHRONIC PAIN OF LEFT KNEE: Primary | ICD-10-CM

## 2024-04-19 PROCEDURE — 97110 THERAPEUTIC EXERCISES: CPT | Mod: GP

## 2024-04-19 PROCEDURE — 97530 THERAPEUTIC ACTIVITIES: CPT | Mod: GP

## 2024-04-19 ASSESSMENT — PAIN - FUNCTIONAL ASSESSMENT: PAIN_FUNCTIONAL_ASSESSMENT: 0-10

## 2024-04-19 ASSESSMENT — PAIN SCALES - GENERAL: PAINLEVEL_OUTOF10: 2

## 2024-04-19 NOTE — PROGRESS NOTES
Physical Therapy  Physical Therapy Discharge Summary/ Treatment    Patient Name: Hanh Ng  MRN: [id number]  Today's Date: 4/19/2024  Time Calculation  Start Time: 1419  Stop Time: 1458  Time Calculation (min): 39 min    Assessment:   Pt completed treatment with moderate effort this date    Discharge Summary: PT    Discharge Information: Date of discharge 4/19/2024, Date of last visit 4/19/2024, Date of evaluation 1/15/2024, Number of attended visits 8, Referred by Gm, and Referred for knee pain    Therapy Summary: Pt made modest progress with AROM most in knee than hip but her strength gains were in all muyscle groups in BLE.  She reports less pain and made good progress on the WOMAC.  She is independent with her HEP and is regularly going to the gym for general strengthening and conditioning.      Discharge Status: improved functional mobility      Rehab Discharge Reason: Achieved all and/or the most significant goals(s)    Plan:  DC PT       General  Chief Complaint:   1. Chronic pain of left knee        2. Acute medial meniscal tear, left, sequela  Follow Up In Physical Therapy          Subjective:     Pt just returned from out of town vacation, reports she was able to maintain gym routine while on vacation, she had some stiffness during the car ride down    Current Problem  General  Reason for Referral: L knee pain  Referred By: walker  Past Medical History Relevant to Rehab: L meniscus tear  General Comment: Visit 8/17    Precautions  Precautions Comment: none    Performing HEP?: Yes    Pain  Pain Assessment: 0-10  Pain Score: 2  Pain Type: Chronic pain  Pain Location: Knee  Pain Orientation: Left    Objective:   Hip Functional Rating Scale  Other: WOMAC 37/96 38% self reported disability  Hip Palpation/Joint Mobility Assessment  Palpation / Joint Mobility Comment: no TTP L GT, MCL, and medial joint space, minimal TTP inferior pole of L patella    Hip AROM  R hip flexion: (125°): 120  L hip  flexion: (125°): 120  R hip abduction: (45°): 45  R hip extension: (10°): 10  L hip extension: (10°): -10  R hip ER: (45°): 45  L hip ER: (45°): 45  R hip IR: (45°): 30  L hip IR: (45°): 10    Specific Lower Extremity MMT  R Iliopsoas: (5/5): 5/5  L Iliopsoas: (5/5): 5/5  R Gluteals (prone): (5/5): 4-/5  L Gluteals (prone): (5/5): 4-/5  R Gluteals (sidelying): (5/5): 4-/5  L Gluteals (sidelying): (5/5): 4-/5  R knee flexion: (5/5): 5/5  L knee flexion: (5/5): 5/5  R knee extension: (5/5): 5/5  L knee extension: (5/5): 5/5    Special Tests  Supine SLR: (Negative): LLE x7  DAY: (Negative): neg, neg FADDIR    Flexibility  R hamstrings: 0  L hamstrings: 0    Knee AROM  L knee flexion: (140°): 125  L knee extension: (0°): 0    Treatments:   This therapist instructed and demonstrated interventions to patient, patient completed the following under direct supervision of this therapist:  Therapeutic Exercise:   min  15 MINUTES  Therapeutic Exercise  Therapeutic Exercise Activity 1: Scifit: seat 10 level 1, 6'  Therapeutic Exercise Activity 2: hooklying abduction red TB x25  Therapeutic Exercise Activity 3: retro walking on treadmill x2 mins .8mph  Therapeutic Exercise Activity 4: walking on treatmill x3 mins with grade 2 1.3 mph  Treadmill performed for pt to trial elevation changes under supervision on treadmill for gym routine  Therapeutic Activity:      24 MINUTES  Therapeutic Activity  Therapeutic Activity 1: recheck progress and goals      Active       PT Problem       report 25% decrease in pain intensity in order to complete work tasks with greater ease  (Progressing)       Start:  01/15/24    Expected End:  04/08/24            demonstrate 1/3 a muscle grade strength increase in b/l hips  in order to complete STS easier   (Met)       Start:  01/15/24    Expected End:  04/08/24    Resolved:  04/19/24         increased ROM of L hip +15 degrees in order to improve gait mechanics  (Progressing)       Start:  01/15/24     Expected End:  04/08/24            Pt will improve WOMAC score by 10% to demonstrate in order to show increased functional mobility  (Progressing)       Start:  01/15/24    Expected End:  04/08/24         Goal Note       +9 points on scale              demo HEP w/ at least 75% accuracy in order to increase strength and flexibility  (Met)       Start:  01/15/24    Expected End:  04/08/24    Resolved:  04/19/24             Education:   Discussed POC options and gym routine    Aung Whiteside, PT

## 2024-04-25 ENCOUNTER — APPOINTMENT (OUTPATIENT)
Dept: PHYSICAL THERAPY | Facility: CLINIC | Age: 69
End: 2024-04-25
Payer: MEDICARE